# Patient Record
Sex: FEMALE | Race: WHITE | NOT HISPANIC OR LATINO | Employment: FULL TIME | URBAN - NONMETROPOLITAN AREA
[De-identification: names, ages, dates, MRNs, and addresses within clinical notes are randomized per-mention and may not be internally consistent; named-entity substitution may affect disease eponyms.]

---

## 2024-08-08 ENCOUNTER — OFFICE VISIT (OUTPATIENT)
Dept: FAMILY MEDICINE CLINIC | Facility: CLINIC | Age: 24
End: 2024-08-08
Payer: COMMERCIAL

## 2024-08-08 VITALS
DIASTOLIC BLOOD PRESSURE: 76 MMHG | HEIGHT: 66 IN | BODY MASS INDEX: 40.45 KG/M2 | OXYGEN SATURATION: 98 % | WEIGHT: 251.7 LBS | HEART RATE: 69 BPM | SYSTOLIC BLOOD PRESSURE: 118 MMHG

## 2024-08-08 DIAGNOSIS — E66.01 MORBID (SEVERE) OBESITY DUE TO EXCESS CALORIES: ICD-10-CM

## 2024-08-08 DIAGNOSIS — Z13.220 LIPID SCREENING: ICD-10-CM

## 2024-08-08 DIAGNOSIS — Z13.29 THYROID DISORDER SCREEN: ICD-10-CM

## 2024-08-08 DIAGNOSIS — Z13.1 DIABETES MELLITUS SCREENING: ICD-10-CM

## 2024-08-08 DIAGNOSIS — Z00.00 ANNUAL PHYSICAL EXAM: Primary | ICD-10-CM

## 2024-08-08 PROCEDURE — 99395 PREV VISIT EST AGE 18-39: CPT | Performed by: FAMILY MEDICINE

## 2024-08-08 PROCEDURE — 36415 COLL VENOUS BLD VENIPUNCTURE: CPT | Performed by: FAMILY MEDICINE

## 2024-08-08 RX ORDER — OMEPRAZOLE 40 MG/1
40 CAPSULE, DELAYED RELEASE ORAL DAILY
Qty: 90 CAPSULE | Refills: 3 | Status: SHIPPED | OUTPATIENT
Start: 2024-08-08

## 2024-08-08 RX ORDER — SEMAGLUTIDE 0.25 MG/.5ML
0.25 INJECTION, SOLUTION SUBCUTANEOUS WEEKLY
Qty: 2 ML | Refills: 0 | Status: SHIPPED | OUTPATIENT
Start: 2024-08-08

## 2024-08-08 NOTE — PROGRESS NOTES
Female Physical Note      Date: 2024   Patient Name: Maria A Iniguez  : 2000   MRN: [id number]     Chief Complaint:    Chief Complaint   Patient presents with    [facility]    Annual Exam     Fasting for labs    Would like to discuss weight management        History of Present Illness: Maria A Iniguez is a 23 y.o. female who is here today for their annual health maintenance and physical.  Patient is here for annual physical and labs.  She denies any new complaints other than concerns for weight loss management.  Patient states she has been working out and dieting and not losing weight.  She would like to have her thyroid checked.      Subjective      Review of Systems:   Review of Systems   All other systems reviewed and are negative.      Past Medical History, Social History, Family History and Care Team were all reviewed with patient and updated as appropriate.     Medications:     Current Outpatient Medications:     omeprazole (priLOSEC) 40 MG capsule, Take 1 capsule by mouth Daily., Disp: 90 capsule, Rfl: 3    Sprintec 28 0.25-35 MG-MCG per tablet, Take 1 tablet by mouth Daily., Disp: , Rfl:     Semaglutide-Weight Management (Wegovy) 0.25 MG/0.5ML solution auto-injector, Inject 0.5 mL under the skin into the appropriate area as directed 1 (One) Time Per Week., Disp: 2 mL, Rfl: 0    Allergies:   No Known Allergies    Immunizations:  Health Maintenance Summary            Overdue - TDAP/TD VACCINES (2 - Td or Tdap) Overdue since 2011  Imm Admin: Tdap              Overdue - HEPATITIS C SCREENING (Once) Never done      No completion, postpone, or frequency change history exists for this topic.              Overdue - CHLAMYDIA SCREENING (Yearly) Never done      No completion, postpone, or frequency change history exists for this topic.              INFLUENZA VACCINE (Yearly - August to March) Due since 2022  Imm Admin: Fluzone (or Fluarix & Flulaval for  "VFC) >6mos    01/24/2020  Imm Admin: Influenza, Unspecified    01/24/2020  Imm Admin: Influenza Injectable Mdck Pf Quad    11/02/2016  Imm Admin: Flu Vaccine Quad PF >36MO    11/02/2016  Imm Admin: Fluzone (or Fluarix & Flulaval for VFC) >6mos    Only the first 5 history entries have been loaded, but more history exists.              Postponed - COVID-19 Vaccine (3 - 2023-24 season) Postponed until 10/28/2024      08/08/2024  Postponed until 10/28/2024 by Sayda Salazar CMA (Product Unavailable)    09/12/2021  Imm Admin: COVID-19 (PFIZER) PURPLE CAP    08/18/2021  Imm Admin: COVID-19 (PFIZER) PURPLE CAP              ANNUAL PHYSICAL (Yearly) Next due on 8/23/2024 08/23/2023  Registry Metric: Last Annual Physical              BMI FOLLOWUP (Yearly) Next due on 8/8/2025 08/08/2024  [employer]: WORKFLOW - QUALITY MEASUREMENT - DOCUMENTED WEIGHT FOLLOW-UP PLAN              PAP SMEAR (Every 3 Years) Next due on 3/18/2027      03/18/2024  Patient-Reported (Performed Externally) - [facility] [address]              HPV VACCINES (Series Information) Completed      01/25/2012  Imm Admin: HPV Quadrivalent    09/06/2011  Imm Admin: HPV Quadrivalent    06/24/2011  Imm Admin: HPV, Unspecified              Pneumococcal Vaccine 0-64 (Series Information) Aged Out      No completion, postpone, or frequency change history exists for this topic.                     No orders of the defined types were placed in this encounter.              HIV (Age 15-65 once): No results found for: \"HIV1X2\"  A1c: No results found for: \"HGBA1C\"   Lipid panel:  No results found for: \"LIPIDEXCLUSI\"    The ASCVD Risk score (Naseem DK, et al., 2019) failed to calculate for the following reasons:    The 2019 ASCVD risk score is only valid for ages 40 to 79      Tobacco Use: Low Risk  (8/8/2024)    Patient History     Smoking Tobacco Use: Never     Smokeless Tobacco Use: Never     Passive Exposure: Not on file       Social History " "    Substance and Sexual Activity   Alcohol Use Yes    Comment: very rare        Social History     Substance and Sexual Activity   Drug Use Never        Diet/Physical activity: Routine physical activity and specific diet plan for weight loss.    Depression: PHQ-2 Depression Screening  PHQ-9 Total Score: 0       Objective     Physical Exam:  Vital Signs:   Vitals:    08/08/24 0839   BP: 118/76   BP Location: Left arm   Patient Position: Sitting   Cuff Size: Large Adult   Pulse: 69   SpO2: 98%   Weight: 114 kg (251 lb 11.2 oz)   Height: 167.6 cm (66\")     Facility age limit for growth %cherrie is 20 years.  Body mass index is 40.63 kg/m².     Physical Exam  Vitals and nursing note reviewed.   Constitutional:       General: She is not in acute distress.     Appearance: Normal appearance. She is not ill-appearing or toxic-appearing.   HENT:      Head: Normocephalic and atraumatic.      Mouth/Throat:      Pharynx: Oropharynx is clear.   Eyes:      Extraocular Movements: Extraocular movements intact.      Pupils: Pupils are equal, round, and reactive to light.   Neck:      Thyroid: No thyromegaly.   Cardiovascular:      Rate and Rhythm: Normal rate and regular rhythm.   Pulmonary:      Effort: Pulmonary effort is normal.      Breath sounds: Normal breath sounds.   Musculoskeletal:      Cervical back: Neck supple.   Lymphadenopathy:      Cervical: No cervical adenopathy.   Neurological:      General: No focal deficit present.      Mental Status: She is alert.   Psychiatric:         Mood and Affect: Mood normal.         Thought Content: Thought content normal.         Judgment: Judgment normal.         POCT Results (if applicable);   Results for orders placed or performed in visit on 08/23/23   Comprehensive Metabolic Panel    Specimen: Arm, Right; Blood   Result Value Ref Range    Glucose 83 70 - 99 mg/dL    BUN 10 6 - 20 mg/dL    Creatinine 0.59 0.57 - 1.00 mg/dL    EGFR Result 130 >59 mL/min/1.73    BUN/Creatinine Ratio 17 " 9 - 23    Sodium 138 134 - 144 mmol/L    Potassium 4.5 3.5 - 5.2 mmol/L    Chloride 103 96 - 106 mmol/L    Total CO2 20 20 - 29 mmol/L    Calcium 9.1 8.7 - 10.2 mg/dL    Total Protein 6.9 6.0 - 8.5 g/dL    Albumin 4.2 4.0 - 5.0 g/dL    Globulin 2.7 1.5 - 4.5 g/dL    A/G Ratio 1.6 1.2 - 2.2    Total Bilirubin <0.2 0.0 - 1.2 mg/dL    Alkaline Phosphatase 67 44 - 121 IU/L    AST (SGOT) 11 0 - 40 IU/L    ALT (SGPT) 8 0 - 32 IU/L   Lipid Panel    Specimen: Arm, Right; Blood   Result Value Ref Range    Total Cholesterol 192 100 - 199 mg/dL    Triglycerides 91 0 - 149 mg/dL    HDL Cholesterol 64 >39 mg/dL    VLDL Cholesterol Brian 16 5 - 40 mg/dL    LDL Chol Calc ([facility]) 112 (H) 0 - 99 mg/dL   TSH    Specimen: Arm, Right; Blood   Result Value Ref Range    TSH 1.150 0.450 - 4.500 uIU/mL   CBC Auto Differential    Specimen: Arm, Right; Blood   Result Value Ref Range    WBC 6.5 3.4 - 10.8 x10E3/uL    RBC 3.88 3.77 - 5.28 x10E6/uL    Hemoglobin 11.9 11.1 - 15.9 g/dL    Hematocrit 36.3 34.0 - 46.6 %    MCV 94 79 - 97 fL    MCH 30.7 26.6 - 33.0 pg    MCHC 32.8 31.5 - 35.7 g/dL    RDW 12.6 11.7 - 15.4 %    Platelets 352 150 - 450 x10E3/uL    Neutrophil Rel % 59 Not Estab. %    Lymphocyte Rel % 31 Not Estab. %    Monocyte Rel % 7 Not Estab. %    Eosinophil Rel % 3 Not Estab. %    Basophil Rel % 0 Not Estab. %    Neutrophils Absolute 3.8 1.4 - 7.0 x10E3/uL    Lymphocytes Absolute 2.0 0.7 - 3.1 x10E3/uL    Monocytes Absolute 0.5 0.1 - 0.9 x10E3/uL    Eosinophils Absolute 0.2 0.0 - 0.4 x10E3/uL    Basophils Absolute 0.0 0.0 - 0.2 x10E3/uL    Immature Granulocyte Rel % 0 Not Estab. %    Immature Grans Absolute 0.0 0.0 - 0.1 x10E3/uL        Procedures    Assessment / Plan      Assessment/Plan:   Assessment & Plan  Annual physical exam  We will order routine labs.  Morbid (severe) obesity due to excess calories  Discussed options.  Patient is interested in a trial of Wegovy.  Will initiate at 0.25 mg weekly for a month.  If tolerating well  we will titrate.  Patient is to notify me if she would like to increase the dose in 1 month.  Will check TSH.  Thyroid disorder screen    Diabetes mellitus screening    Lipid screening      Orders Placed This Encounter   Procedures    Hemoglobin A1c    TSH    CBC Auto Differential    Comprehensive Metabolic Panel    Lipid Panel     New Medications Ordered This Visit   Medications    omeprazole (priLOSEC) 40 MG capsule     Sig: Take 1 capsule by mouth Daily.     Dispense:  90 capsule     Refill:  3    Semaglutide-Weight Management (Wegovy) 0.25 MG/0.5ML solution auto-injector     Sig: Inject 0.5 mL under the skin into the appropriate area as directed 1 (One) Time Per Week.     Dispense:  2 mL     Refill:  0          Healthcare Maintenance:  Counseling provided based on age appropriate USPSTF guidelines.       Maria A Iniguez voices understanding and acceptance of this advice and will call back with any further questions or concerns. AVS with preventive healthcare tips printed for patient.     Vaccine Counseling:      Follow Up:   Return in about 3 months (around 11/8/2024).      Shabbir Martini MD  [facility]

## 2024-08-09 ENCOUNTER — TELEPHONE (OUTPATIENT)
Dept: FAMILY MEDICINE CLINIC | Facility: CLINIC | Age: 24
End: 2024-08-09
Payer: COMMERCIAL

## 2024-08-09 LAB
ALBUMIN SERPL-MCNC: 4.1 G/DL (ref 4–5)
ALP SERPL-CCNC: 75 IU/L (ref 44–121)
ALT SERPL-CCNC: 10 IU/L (ref 0–32)
AST SERPL-CCNC: 15 IU/L (ref 0–40)
BASOPHILS # BLD AUTO: 0 X10E3/UL (ref 0–0.2)
BASOPHILS NFR BLD AUTO: 0 %
BILIRUB SERPL-MCNC: 0.2 MG/DL (ref 0–1.2)
BUN SERPL-MCNC: 7 MG/DL (ref 6–20)
BUN/CREAT SERPL: 10 (ref 9–23)
CALCIUM SERPL-MCNC: 9.4 MG/DL (ref 8.7–10.2)
CHLORIDE SERPL-SCNC: 106 MMOL/L (ref 96–106)
CHOLEST SERPL-MCNC: 159 MG/DL (ref 100–199)
CO2 SERPL-SCNC: 19 MMOL/L (ref 20–29)
CREAT SERPL-MCNC: 0.7 MG/DL (ref 0.57–1)
EGFRCR SERPLBLD CKD-EPI 2021: 124 ML/MIN/1.73
EOSINOPHIL # BLD AUTO: 0.1 X10E3/UL (ref 0–0.4)
EOSINOPHIL NFR BLD AUTO: 2 %
ERYTHROCYTE [DISTWIDTH] IN BLOOD BY AUTOMATED COUNT: 12.7 % (ref 11.7–15.4)
GLOBULIN SER CALC-MCNC: 3 G/DL (ref 1.5–4.5)
GLUCOSE SERPL-MCNC: 82 MG/DL (ref 70–99)
HBA1C MFR BLD: 5.2 % (ref 4.8–5.6)
HCT VFR BLD AUTO: 37.1 % (ref 34–46.6)
HDLC SERPL-MCNC: 52 MG/DL
HGB BLD-MCNC: 11.7 G/DL (ref 11.1–15.9)
IMM GRANULOCYTES # BLD AUTO: 0 X10E3/UL (ref 0–0.1)
IMM GRANULOCYTES NFR BLD AUTO: 0 %
LDLC SERPL CALC-MCNC: 88 MG/DL (ref 0–99)
LYMPHOCYTES # BLD AUTO: 1.9 X10E3/UL (ref 0.7–3.1)
LYMPHOCYTES NFR BLD AUTO: 29 %
MCH RBC QN AUTO: 30.2 PG (ref 26.6–33)
MCHC RBC AUTO-ENTMCNC: 31.5 G/DL (ref 31.5–35.7)
MCV RBC AUTO: 96 FL (ref 79–97)
MONOCYTES # BLD AUTO: 0.4 X10E3/UL (ref 0.1–0.9)
MONOCYTES NFR BLD AUTO: 7 %
NEUTROPHILS # BLD AUTO: 4.1 X10E3/UL (ref 1.4–7)
NEUTROPHILS NFR BLD AUTO: 62 %
PLATELET # BLD AUTO: 356 X10E3/UL (ref 150–450)
POTASSIUM SERPL-SCNC: 4.8 MMOL/L (ref 3.5–5.2)
PROT SERPL-MCNC: 7.1 G/DL (ref 6–8.5)
RBC # BLD AUTO: 3.88 X10E6/UL (ref 3.77–5.28)
SODIUM SERPL-SCNC: 139 MMOL/L (ref 134–144)
TRIGL SERPL-MCNC: 102 MG/DL (ref 0–149)
TSH SERPL DL<=0.005 MIU/L-ACNC: 1.16 UIU/ML (ref 0.45–4.5)
VLDLC SERPL CALC-MCNC: 19 MG/DL (ref 5–40)
WBC # BLD AUTO: 6.5 X10E3/UL (ref 3.4–10.8)

## 2024-08-17 DIAGNOSIS — E66.01 MORBID (SEVERE) OBESITY DUE TO EXCESS CALORIES: ICD-10-CM

## 2024-08-19 NOTE — TELEPHONE ENCOUNTER
Call to Pharmacy, confirmed rx was picked up on 8/11/24.  Call to patient, no answer, VM not set up. (Re: wegovy refill too soon)

## 2024-09-10 DIAGNOSIS — E66.01 MORBID (SEVERE) OBESITY DUE TO EXCESS CALORIES: ICD-10-CM

## 2024-09-10 RX ORDER — SEMAGLUTIDE 0.25 MG/.5ML
0.25 INJECTION, SOLUTION SUBCUTANEOUS WEEKLY
Qty: 2 ML | Refills: 0 | Status: CANCELLED | OUTPATIENT
Start: 2024-09-10

## 2024-09-11 RX ORDER — SEMAGLUTIDE 0.25 MG/.5ML
INJECTION, SOLUTION SUBCUTANEOUS
Qty: 4 ML | Refills: 0 | OUTPATIENT
Start: 2024-09-11

## 2024-09-11 RX ORDER — SEMAGLUTIDE 0.5 MG/.5ML
0.5 INJECTION, SOLUTION SUBCUTANEOUS WEEKLY
Qty: 2 ML | Refills: 0 | Status: SHIPPED | OUTPATIENT
Start: 2024-09-11

## 2024-10-07 DIAGNOSIS — E66.01 MORBID (SEVERE) OBESITY DUE TO EXCESS CALORIES: ICD-10-CM

## 2024-10-07 DIAGNOSIS — E66.01 MORBID (SEVERE) OBESITY DUE TO EXCESS CALORIES: Primary | ICD-10-CM

## 2024-10-07 RX ORDER — SEMAGLUTIDE 1 MG/.5ML
1 INJECTION, SOLUTION SUBCUTANEOUS WEEKLY
Qty: 2 ML | Refills: 0 | Status: SHIPPED | OUTPATIENT
Start: 2024-10-07

## 2024-10-07 RX ORDER — SEMAGLUTIDE 0.5 MG/.5ML
INJECTION, SOLUTION SUBCUTANEOUS
Qty: 4 ML | Refills: 0 | OUTPATIENT
Start: 2024-10-07

## 2024-11-08 ENCOUNTER — OFFICE VISIT (OUTPATIENT)
Dept: FAMILY MEDICINE CLINIC | Facility: CLINIC | Age: 24
End: 2024-11-08
Payer: COMMERCIAL

## 2024-11-08 VITALS
HEART RATE: 96 BPM | HEIGHT: 66 IN | OXYGEN SATURATION: 99 % | BODY MASS INDEX: 37.45 KG/M2 | WEIGHT: 233 LBS | SYSTOLIC BLOOD PRESSURE: 114 MMHG | DIASTOLIC BLOOD PRESSURE: 70 MMHG

## 2024-11-08 DIAGNOSIS — N94.9 ADNEXAL CYST: ICD-10-CM

## 2024-11-08 DIAGNOSIS — R11.2 NAUSEA VOMITING AND DIARRHEA: Primary | ICD-10-CM

## 2024-11-08 DIAGNOSIS — R91.1 PULMONARY NODULE: ICD-10-CM

## 2024-11-08 DIAGNOSIS — E66.01 MORBID (SEVERE) OBESITY DUE TO EXCESS CALORIES: ICD-10-CM

## 2024-11-08 DIAGNOSIS — R19.7 NAUSEA VOMITING AND DIARRHEA: Primary | ICD-10-CM

## 2024-11-08 DIAGNOSIS — Z23 NEED FOR INFLUENZA VACCINATION: ICD-10-CM

## 2024-11-08 DIAGNOSIS — R14.0 ABDOMINAL BLOATING: ICD-10-CM

## 2024-11-08 RX ORDER — ONDANSETRON 4 MG/1
4 TABLET, ORALLY DISINTEGRATING ORAL EVERY 6 HOURS
COMMUNITY
Start: 2024-11-04 | End: 2024-11-08

## 2024-11-08 RX ORDER — CEFDINIR 300 MG/1
300 CAPSULE ORAL EVERY 12 HOURS
COMMUNITY
Start: 2024-11-04

## 2024-11-08 RX ORDER — ONDANSETRON 4 MG/1
4 TABLET, ORALLY DISINTEGRATING ORAL EVERY 8 HOURS PRN
Qty: 15 TABLET | Refills: 1 | Status: SHIPPED | OUTPATIENT
Start: 2024-11-08

## 2024-11-08 RX ORDER — FLUCONAZOLE 150 MG/1
150 TABLET ORAL ONCE
Qty: 1 TABLET | Refills: 1 | Status: SHIPPED | OUTPATIENT
Start: 2024-11-08 | End: 2024-11-08

## 2024-11-08 NOTE — PROGRESS NOTES
Follow Up Office Visit      Patient Name: Maria A Iniguez  : 2000   MRN: [id number]     Chief Complaint:    Chief Complaint   Patient presents with    Nausea    Abdominal Pain    Bloated       History of Present Illness: Maria A Iniguez is a 23 y.o. female who is here today for follow up with ER visit.  Patient was in the ER with nausea, abdominal bloating/abdominal pain, diarrhea.  Patient CT scan with nonspecific findings of the abdomen.  There were lung nodules noted incidentally undetermined etiology.  Patient states her next dose of Wegovy should be tomorrow.  Patient is requesting a refill on Zofran and prescription for Diflucan.    Subjective      Review of Systems:   Review of Systems   Constitutional:  Negative for fever.   Gastrointestinal:  Positive for abdominal distention, abdominal pain, diarrhea, nausea, vomiting, GERD and indigestion.   All other systems reviewed and are negative.  Answers submitted by the patient for this visit:  Other (Submitted on 2024)  Please describe your symptoms.: Following up on my wegovy appointment. , Have had some things going on: bloating, vomiting (and including throwing up bile), nausea, gas (including sulfur burps), diarrhea, acid reflux,  Have you had these symptoms before?: Yes  How long have you been having these symptoms?: Greater than 2 weeks  Please list any medications you are currently taking for this condition.: Currently have an UTI taking: cefdinir 300mg (can I be prescribed diflucan to prevent w yeast infection?), Zofran (last one today), Sprintec, Omeprazole 40mg  Please describe any probable cause for these symptoms. : Not sure if its side effects or if i caught something. , I had a scan done and the tech said there was something in my lower adonmen that may be it.  Primary Reason for Visit (Submitted on 2024)  What is the primary reason for your visit?: Problem Not Listed      The following portions of the patient's history were  "reviewed and updated as appropriate: allergies, current medications, past family history, past medical history, past social history, past surgical history and problem list.    Medications:     Current Outpatient Medications:     cefdinir (OMNICEF) 300 MG capsule, Take 1 capsule by mouth Every 12 (Twelve) Hours., Disp: , Rfl:     omeprazole (priLOSEC) 40 MG capsule, Take 1 capsule by mouth Daily., Disp: 90 capsule, Rfl: 3    Sprintec 28 0.25-35 MG-MCG per tablet, Take 1 tablet by mouth Daily., Disp: , Rfl:     fluconazole (Diflucan) 150 MG tablet, Take 1 tablet by mouth 1 (One) Time for 1 dose., Disp: 1 tablet, Rfl: 1    ondansetron ODT (ZOFRAN-ODT) 4 MG disintegrating tablet, Place 1 tablet on the tongue Every 8 (Eight) Hours As Needed for Nausea or Vomiting., Disp: 15 tablet, Rfl: 1    Allergies:   Allergies   Allergen Reactions    Lactose GI Intolerance    Red Dye GI Intolerance     As a child        Objective     Physical Exam:  Vital Signs:   Vitals:    11/08/24 0801   BP: 114/70   BP Location: Right arm   Patient Position: Sitting   Cuff Size: Large Adult   Pulse: 96   SpO2: 99%   Weight: 106 kg (233 lb)   Height: 167.6 cm (66\")   PainSc:   1     Body mass index is 37.61 kg/m².   Facility age limit for growth %cherrie is 20 years.    Physical Exam  Vitals and nursing note reviewed.   Constitutional:       General: She is not in acute distress.     Appearance: Normal appearance. She is not ill-appearing.   Cardiovascular:      Rate and Rhythm: Normal rate.   Pulmonary:      Effort: Pulmonary effort is normal.   Abdominal:      Tenderness: There is abdominal tenderness. There is no guarding or rebound.       Neurological:      Mental Status: She is alert.         Procedures    PHQ-9 Total Score:      Assessment / Plan      Assessment/Plan:   Assessment & Plan  Nausea vomiting and diarrhea         Need for influenza vaccination    Orders:    Fluzone >6mos ([id number])    Abdominal bloating         Pulmonary " nodule    Orders:    Ambulatory Referral to Pulmonology    Morbid (severe) obesity due to excess calories       Adnexal cyst            I suspect symptoms are side effects of Wegovy.  I have recommended patient stop the Wegovy.  Will send in Zofran for nausea.  Will see back in 2 weeks to see how she is doing.  We may consider a trial of Zepbound.  Will discuss further at that time.  Incidentally, pulmonary nodules were noted on CT scan reviewed from 11/4/2024.  Will refer to the nodule clinic for further evaluation.  I recommend patient obtain a CD of her CT scan to take with her.  I recommend patient follow-up with her GYN for adnexal cyst.           Follow Up:   Return in about 2 weeks (around 11/22/2024).      ROSCOE Martini MD  [facility]

## 2024-12-03 ENCOUNTER — OFFICE VISIT (OUTPATIENT)
Dept: FAMILY MEDICINE CLINIC | Facility: CLINIC | Age: 24
End: 2024-12-03
Payer: COMMERCIAL

## 2024-12-03 VITALS
HEIGHT: 66 IN | BODY MASS INDEX: 38.09 KG/M2 | RESPIRATION RATE: 14 BRPM | WEIGHT: 237 LBS | SYSTOLIC BLOOD PRESSURE: 112 MMHG | HEART RATE: 70 BPM | DIASTOLIC BLOOD PRESSURE: 68 MMHG

## 2024-12-03 DIAGNOSIS — R11.0 NAUSEA: ICD-10-CM

## 2024-12-03 DIAGNOSIS — E66.01 MORBID (SEVERE) OBESITY DUE TO EXCESS CALORIES: Primary | ICD-10-CM

## 2024-12-03 PROCEDURE — 99213 OFFICE O/P EST LOW 20 MIN: CPT | Performed by: FAMILY MEDICINE

## 2024-12-03 RX ORDER — ONDANSETRON 4 MG/1
4 TABLET, ORALLY DISINTEGRATING ORAL EVERY 8 HOURS PRN
Qty: 20 TABLET | Refills: 1 | Status: SHIPPED | OUTPATIENT
Start: 2024-12-03

## 2024-12-03 NOTE — PROGRESS NOTES
Follow Up Office Visit      Patient Name: Maria A Iniguez  : 2000   MRN: [id number]     Chief Complaint:    Chief Complaint   Patient presents with    Obesity       History of Present Illness: Maria A Iniguez is a 23 y.o. female who is here today for follow up with nausea and vomiting.  Symptoms have resolved off semaglutide.  She is interested in trying tirzepatide.  She denies any other complaints today.    Subjective      Review of Systems:   Review of Systems   Constitutional:  Negative for chills, diaphoresis, fatigue and fever.   HENT:  Negative for congestion, sore throat and swollen glands.    Respiratory:  Negative for cough.    Cardiovascular:  Negative for chest pain.   Gastrointestinal:  Negative for abdominal pain, nausea and vomiting.   Genitourinary:  Negative for dysuria.   Musculoskeletal:  Negative for myalgias and neck pain.   Skin:  Negative for rash.   Neurological:  Negative for weakness and numbness.       The following portions of the patient's history were reviewed and updated as appropriate: allergies, current medications, past family history, past medical history, past social history, past surgical history and problem list.    Medications:     Current Outpatient Medications:     omeprazole (priLOSEC) 40 MG capsule, Take 1 capsule by mouth Daily., Disp: 90 capsule, Rfl: 3    Sprintec 28 0.25-35 MG-MCG per tablet, Take 1 tablet by mouth Daily., Disp: , Rfl:     ondansetron ODT (ZOFRAN-ODT) 4 MG disintegrating tablet, Place 1 tablet on the tongue Every 8 (Eight) Hours As Needed for Nausea or Vomiting., Disp: 20 tablet, Rfl: 1    Tirzepatide-Weight Management (ZEPBOUND) 2.5 MG/0.5ML solution auto-injector, Inject 0.5 mL under the skin into the appropriate area as directed 1 (One) Time Per Week., Disp: 2 mL, Rfl: 0    Allergies:   Allergies   Allergen Reactions    Lactose GI Intolerance    Red Dye GI Intolerance     As a child        Objective     Physical Exam:  Vital Signs:  "  Vitals:    12/03/24 1132   BP: 112/68   BP Location: Left arm   Patient Position: Sitting   Cuff Size: Adult   Pulse: 70   Resp: 14   Weight: 108 kg (237 lb)   Height: 167.6 cm (66\")   PainSc: 0-No pain     Body mass index is 38.25 kg/m².   Facility age limit for growth %cherrie is 20 years.    Physical Exam  Vitals and nursing note reviewed.   Constitutional:       Appearance: She is obese.   Cardiovascular:      Rate and Rhythm: Normal rate.   Pulmonary:      Effort: Pulmonary effort is normal.   Neurological:      Mental Status: She is alert. Mental status is at baseline.   Psychiatric:         Mood and Affect: Mood normal.         Thought Content: Thought content normal.         Judgment: Judgment normal.         Procedures    PHQ-9 Total Score:      Assessment / Plan      Assessment/Plan:   Assessment & Plan  Morbid (severe) obesity due to excess calories  Nausea, vomiting resolved after stopping semaglutide.  She is fairly confident the symptoms were side effects of semaglutide.  She is interested in trying tirzepatide.  Will start at 2.5 mg and titrate monthly as discussed.  I recommend patient notify me if she develops nausea and vomiting.  Will refill Zofran to use as needed.    Orders:    Tirzepatide-Weight Management (ZEPBOUND) 2.5 MG/0.5ML solution auto-injector; Inject 0.5 mL under the skin into the appropriate area as directed 1 (One) Time Per Week.    Nausea    Orders:    ondansetron ODT (ZOFRAN-ODT) 4 MG disintegrating tablet; Place 1 tablet on the tongue Every 8 (Eight) Hours As Needed for Nausea or Vomiting.                  Follow Up:   No follow-ups on file.      ROSCOE Martini MD  [facility] [place]  Answers submitted by the patient for this visit:  Primary Reason for Visit (Submitted on 12/3/2024)  What is the primary reason for your visit?: Problem Not Listed  Problem not listed (Submitted on 12/3/2024)  Chief Complaint: Other medical problem  Reason for appointment: Weught loss " medience  anorexia: No  joint pain: No  change in stool: No  headaches: No  joint swelling: No  vertigo: No  visual change: No  Medications tried: Tried wegovy  Additional information: Here today to discuss new weight management

## 2024-12-03 NOTE — ASSESSMENT & PLAN NOTE
Nausea, vomiting resolved after stopping semaglutide.  She is fairly confident the symptoms were side effects of semaglutide.  She is interested in trying tirzepatide.  Will start at 2.5 mg and titrate monthly as discussed.  I recommend patient notify me if she develops nausea and vomiting.  Will refill Zofran to use as needed.    Orders:    Tirzepatide-Weight Management (ZEPBOUND) 2.5 MG/0.5ML solution auto-injector; Inject 0.5 mL under the skin into the appropriate area as directed 1 (One) Time Per Week.

## 2025-01-02 DIAGNOSIS — E66.01 MORBID (SEVERE) OBESITY DUE TO EXCESS CALORIES: ICD-10-CM

## 2025-01-02 RX ORDER — TIRZEPATIDE 2.5 MG/.5ML
INJECTION, SOLUTION SUBCUTANEOUS
Qty: 4 ML | Refills: 0 | Status: SHIPPED | OUTPATIENT
Start: 2025-01-02

## 2025-01-09 ENCOUNTER — PATIENT OUTREACH (OUTPATIENT)
Dept: ONCOLOGY | Facility: CLINIC | Age: 25
End: 2025-01-09
Payer: COMMERCIAL

## 2025-01-09 ENCOUNTER — OFFICE VISIT (OUTPATIENT)
Age: 25
End: 2025-01-09
Payer: COMMERCIAL

## 2025-01-09 ENCOUNTER — PATIENT ROUNDING (BHMG ONLY) (OUTPATIENT)
Dept: PULMONOLOGY | Facility: CLINIC | Age: 25
End: 2025-01-09
Payer: COMMERCIAL

## 2025-01-09 VITALS
HEIGHT: 66 IN | DIASTOLIC BLOOD PRESSURE: 70 MMHG | BODY MASS INDEX: 38.25 KG/M2 | HEART RATE: 95 BPM | WEIGHT: 238 LBS | OXYGEN SATURATION: 99 % | SYSTOLIC BLOOD PRESSURE: 122 MMHG

## 2025-01-09 DIAGNOSIS — R91.1 LUNG NODULE: Primary | ICD-10-CM

## 2025-01-09 PROCEDURE — 99203 OFFICE O/P NEW LOW 30 MIN: CPT | Performed by: INTERNAL MEDICINE

## 2025-01-09 NOTE — PROGRESS NOTES
New Patient Pulmonary Office Visit      Patient Name: Maria A Iniguez    Referring Physician: Juan Martini MD    Chief Complaint:    Chief Complaint   Patient presents with    Lung Nodule       History of Present Illness: Maria A Iniguez is a 24 y.o. female who is here today to establish care with Pulmonary.  Patient has a past medical history significant for GERD and obesity.  He was referred to pulmonary for evaluation of pulmonary nodule.  Patient has CT abdomen pelvis showed a pulmonary nodule.  Patient was having abdominal pain and when she was taking Wegovy this led to a CT scan of the abdomen pelvis showing that she had a small left lower lobe pulmonary nodule.  Denies any chest pain, nausea, fever, or chills.    Review of Systems:   Review of Systems   Constitutional:  Negative for chills, fatigue and fever.   HENT:  Negative for congestion and voice change.    Eyes:  Negative for blurred vision.   Respiratory:  Negative for cough, shortness of breath and wheezing.    Cardiovascular:  Negative for chest pain.   Skin:  Negative for dry skin.   Hematological:  Negative for adenopathy.   Psychiatric/Behavioral:  Negative for agitation and depressed mood.        Past Medical History:   Past Medical History:   Diagnosis Date    Allergic     Anemia     Asthma     GERD (gastroesophageal reflux disease)     Pneumonia 2003    Urinary tract infection     Currently on medication for a uti       Past Surgical History:   Past Surgical History:   Procedure Laterality Date    CHOLECYSTECTOMY         Family History:   Family History   Problem Relation Age of Onset    Heart disease Brother     Asthma Mother     Alcohol abuse Maternal Grandfather         Never knew him.    Depression Maternal Grandmother     Hyperlipidemia Paternal Grandfather     Arthritis Paternal Grandmother     Cancer Paternal Grandmother         Triple x breast cancer 2018 and now 2024 lymphatic cancer    Diabetes Maternal Uncle     Early death  "Brother     Heart disease Brother        Social History:   Social History     Socioeconomic History    Marital status:    Tobacco Use    Smoking status: Never    Smokeless tobacco: Never   Vaping Use    Vaping status: Never Used   Substance and Sexual Activity    Alcohol use: Yes     Comment: Seldom    Drug use: Never    Sexual activity: Yes     Partners: Male     Birth control/protection: Birth control pill       Medications:     Current Outpatient Medications:     omeprazole (priLOSEC) 40 MG capsule, Take 1 capsule by mouth Daily., Disp: 90 capsule, Rfl: 3    ondansetron ODT (ZOFRAN-ODT) 4 MG disintegrating tablet, Place 1 tablet on the tongue Every 8 (Eight) Hours As Needed for Nausea or Vomiting., Disp: 20 tablet, Rfl: 1    Sprintec 28 0.25-35 MG-MCG per tablet, Take 1 tablet by mouth Daily., Disp: , Rfl:     Zepbound 2.5 MG/0.5ML solution auto-injector, INJECT 2.5 MG SUBCUTANEOUSLY INTO THE APPROPRIATE AREA AS DIRECTED ONCE PER WEEK, Disp: 4 mL, Rfl: 0    Allergies:   Allergies   Allergen Reactions    Lactose GI Intolerance    Red Dye GI Intolerance     As a child        Physical Exam:  Vital Signs:   Vitals:    01/09/25 0842   BP: 122/70   Pulse: 95   SpO2: 99%   Weight: 108 kg (238 lb)   Height: 167.6 cm (66\")       Physical Exam  Vitals and nursing note reviewed.   Constitutional:       General: She is not in acute distress.     Appearance: She is well-developed and normal weight. She is not ill-appearing or toxic-appearing.   HENT:      Head: Normocephalic and atraumatic.   Cardiovascular:      Rate and Rhythm: Normal rate and regular rhythm.      Pulses: Normal pulses.      Heart sounds: Normal heart sounds. No murmur heard.     No friction rub. No gallop.   Pulmonary:      Effort: Pulmonary effort is normal. No respiratory distress.      Breath sounds: Normal breath sounds. No wheezing, rhonchi or rales.   Musculoskeletal:      Right lower leg: No edema.      Left lower leg: No edema.   Skin:     " General: Skin is warm and dry.   Neurological:      Mental Status: She is alert and oriented to person, place, and time.         Immunization History   Administered Date(s) Administered    COVID-19 (PFIZER) Purple Cap Monovalent 09/12/2021    DTaP 02/24/2000, 04/25/2000, 06/23/2000, 03/28/2001, 08/19/2004    Flu Vaccine Quad PF >36MO 11/02/2016    Fluzone  >6mos 11/08/2024    Fluzone (or Fluarix & Flulaval for VFC) >6mos 11/02/2016, 06/14/2019, 11/21/2022    HPV Quadrivalent 09/06/2011, 01/25/2012    HPV, Unspecified 06/24/2011    Hepatitis B Adult/Adolescent IM 02/24/2000, 06/23/2000    HiB 02/24/2000, 04/25/2000, 06/23/2000    IPV 08/19/2004    Influenza Injectable Mdck Pf Quad 01/24/2020    Influenza, Unspecified 01/24/2020    MMR 03/28/2001, 08/19/2004    Tdap 06/24/2011    Varicella 12/28/2000       Results Review:   - I personally reviewed the pts imaging from CT abdomen pelvis lung windows showed 5 mm left lower lobe noncalcified nodule.  - I personally reviewed the pts chart with regards to evaluation by the patient's PCP    Assessment / Plan:   Diagnoses and all orders for this visit:    1. 5 mm left lower lobe noncalcified nodule (11/2024) (Primary)  -Patient is low risk for cancer.  Given the size of the nodule she does not need any further follow-up.  I did offer her the option of doing a scan in 1 year if she wanted 1 but she was agreeable to not doing anything else.  This is appropriate per Fleischner guidelines.      Follow Up:   Return if symptoms worsen or fail to improve.     KHADIJAH Olivas,   Pulmonary and Critical Care Medicine  Note Electronically Signed    Part of this note may be an electronic transcription/translation of spoken language to printed text using the Dragon Dictation System.

## 2025-01-09 NOTE — PROGRESS NOTES
January 9, 2025    Hello, may I speak with Maria A Iniguez?    My name is Alexandra Urias      I am  with [facility]  [address] [id number] [phone].    Before we get started may I verify your date of birth? 2000    I am calling to officially welcome you to our practice and ask about your recent visit. Is this a good time to talk? yes    Tell me about your visit with us. What things went well?  This patient stated her appointment with Dr. Olivas went well! She said he explained the findings well and eased her concerns. The check in/check out process was smooth. She had no complaints with her overall visit and felt comfortable reaching out to our facility and staff.        We're always looking for ways to make our patients' experiences even better. Do you have recommendations on ways we may improve?  yes This pt stated the NPP she had received in the mail was misleading. When she had scheduled the appointment she was given the [place] location address. However, when she received her NPP in the mail it heavily advertised the [facility] location. She made it to her appointment but had wished for more clarification before her appointment today.     Overall were you satisfied with your first visit to our practice? yes       I appreciate you taking the time to speak with me today. Is there anything else I can do for you? no      Thank you, and have a great day.

## 2025-01-10 ENCOUNTER — TELEPHONE (OUTPATIENT)
Dept: FAMILY MEDICINE CLINIC | Facility: CLINIC | Age: 25
End: 2025-01-10
Payer: COMMERCIAL

## 2025-01-10 NOTE — TELEPHONE ENCOUNTER
Got an approval on 12/4 that stated her zepbound was approved through August of 2025, called patient and she was told by the pharmacy there was an issue with the quantity. Left a vm for the pharmacy to call back and discuss.

## 2025-01-10 NOTE — TELEPHONE ENCOUNTER
Caller: Maria A Iniguez    Relationship: Self    Best call back number:[phone]      Which medication are you concerned about: Zepbound 2.5 MG/0.5ML solution auto-injector     Who prescribed you this medication: DR FONTENOT    When did you start taking this medication: 1 MONTH    What are your concerns: PT IS WAITING ON A PA FOR THE ZEPBOUND BUT HER NEXT SHOT IS SCHEDULED FOR TOMORROW 01-. PT WANTS TO KNOW IF THERE IS ANYTHING SHE CAN DO OR TAKE AS AN ALTERNATIVE WHILE WAITING ON THE PA. PLEASE ADVISE.

## 2025-01-14 ENCOUNTER — TELEPHONE (OUTPATIENT)
Dept: FAMILY MEDICINE CLINIC | Facility: CLINIC | Age: 25
End: 2025-01-14
Payer: COMMERCIAL

## 2025-01-14 NOTE — TELEPHONE ENCOUNTER
Caller: [facility] - [facility] [place]    Relationship to patient:       Best call back number: [phone]     Provider: DR FONTENOT    Medication PA needed: ZEPBOUND    Reason for call/Prior Auth: INSURANCE

## 2025-01-16 NOTE — TELEPHONE ENCOUNTER
PA aprroved on 12/04/24, resubmitted today and not required. Spoke with [facility] pharmacy who states insurance will not pay towards refill on medication until 01/21/25. Patient has been notified.

## 2025-01-17 DIAGNOSIS — E66.01 MORBID (SEVERE) OBESITY DUE TO EXCESS CALORIES: Primary | ICD-10-CM

## 2025-01-17 NOTE — TELEPHONE ENCOUNTER
Patient called and said for zepbound to be covered she needs to go up to the 5mg,  has sent it in and patient notified.

## 2025-02-08 ENCOUNTER — TELEPHONE (OUTPATIENT)
Dept: FAMILY MEDICINE CLINIC | Facility: CLINIC | Age: 25
End: 2025-02-08
Payer: COMMERCIAL

## 2025-02-08 NOTE — TELEPHONE ENCOUNTER
Patient would like a refill on her Zepbound but would like to increase to the 7.5mg please advise and refill

## 2025-03-03 ENCOUNTER — OFFICE VISIT (OUTPATIENT)
Dept: FAMILY MEDICINE CLINIC | Facility: CLINIC | Age: 25
End: 2025-03-03
Payer: COMMERCIAL

## 2025-03-03 VITALS
SYSTOLIC BLOOD PRESSURE: 120 MMHG | HEART RATE: 100 BPM | WEIGHT: 227.1 LBS | DIASTOLIC BLOOD PRESSURE: 76 MMHG | BODY MASS INDEX: 36.5 KG/M2 | OXYGEN SATURATION: 96 % | HEIGHT: 66 IN

## 2025-03-03 DIAGNOSIS — E66.01 MORBID (SEVERE) OBESITY DUE TO EXCESS CALORIES: Primary | ICD-10-CM

## 2025-03-03 PROCEDURE — 99213 OFFICE O/P EST LOW 20 MIN: CPT | Performed by: FAMILY MEDICINE

## 2025-03-03 NOTE — PROGRESS NOTES
"     Follow Up Office Visit      Patient Name: Maria A Iniguez  : 2000   MRN: [id number]     Chief Complaint:    Chief Complaint   Patient presents with    Primary Care Follow-Up     3 month f/u       History of Present Illness: Maria A Iniguez is a 24 y.o. female who is here today for follow up with weight loss.  Patient states she is tolerating the tirzepatide much better than the semaglutide.  She denies any complaints.  She would like to go up on the dosing of tirzepatide.    Subjective      Review of Systems:   Review of Systems   All other systems reviewed and are negative.      The following portions of the patient's history were reviewed and updated as appropriate: allergies, current medications, past family history, past medical history, past social history, past surgical history and problem list.    Medications:     Current Outpatient Medications:     omeprazole (priLOSEC) 40 MG capsule, Take 1 capsule by mouth Daily., Disp: 90 capsule, Rfl: 3    ondansetron ODT (ZOFRAN-ODT) 4 MG disintegrating tablet, Place 1 tablet on the tongue Every 8 (Eight) Hours As Needed for Nausea or Vomiting., Disp: 20 tablet, Rfl: 1    Sprintec 28 0.25-35 MG-MCG per tablet, Take 1 tablet by mouth Daily., Disp: , Rfl:     Tirzepatide-Weight Management (ZEPBOUND) 10 MG/0.5ML solution auto-injector, Inject 0.5 mL under the skin into the appropriate area as directed 1 (One) Time Per Week., Disp: 2 mL, Rfl: 1    Allergies:   Allergies   Allergen Reactions    Lactose GI Intolerance    Red Dye GI Intolerance     As a child        Objective     Physical Exam:  Vital Signs:   Vitals:    25 0759   BP: 120/76   BP Location: Right arm   Patient Position: Sitting   Cuff Size: Adult   Pulse: 100   SpO2: 96%   Weight: 103 kg (227 lb 1.6 oz)   Height: 167.6 cm (66\")     Body mass index is 36.65 kg/m².   Facility age limit for growth %cherrie is 20 years.    Physical Exam  Vitals and nursing note reviewed.   Constitutional:       " Appearance: Normal appearance. She is not ill-appearing.   Cardiovascular:      Rate and Rhythm: Normal rate.   Pulmonary:      Effort: Pulmonary effort is normal.   Neurological:      Mental Status: She is alert.         Procedures    PHQ-9 Total Score:      Assessment / Plan      Assessment/Plan:   Assessment & Plan  Morbid (severe) obesity due to excess calories  Patient is tolerating tirzepatide much better than semaglutide.  Will continue.  Will increase dose to 10 mg weekly.  Will follow-up in 3 months for recheck.  Patient has lost 11 pounds since last visit in January.                     Follow Up:   Return in about 3 months (around 6/3/2025).      ROSCOE Martini MD  [facility] [place]  Answers submitted by the patient for this visit:  Weight Management (Submitted on 3/3/2025)  Chief Complaint: Weight Management  Weight: decreased  Weight change (lbs): 30  Weight loss treatment: portion control, increasing exercise, prescription medications  Medication side effects: nausea  Eating habit changes: Eating smaller portions  Energy level: increased  Physical activity tolerance: improved  Treatment barriers: none

## 2025-03-03 NOTE — ASSESSMENT & PLAN NOTE
Patient is tolerating tirzepatide much better than semaglutide.  Will continue.  Will increase dose to 10 mg weekly.  Will follow-up in 3 months for recheck.  Patient has lost 11 pounds since last visit in January.

## 2025-04-30 RX ORDER — TIRZEPATIDE 10 MG/.5ML
INJECTION, SOLUTION SUBCUTANEOUS
Qty: 2 ML | Refills: 3 | Status: SHIPPED | OUTPATIENT
Start: 2025-04-30

## 2025-05-28 ENCOUNTER — TELEPHONE (OUTPATIENT)
Dept: FAMILY MEDICINE CLINIC | Facility: CLINIC | Age: 25
End: 2025-05-28
Payer: COMMERCIAL

## 2025-05-28 NOTE — TELEPHONE ENCOUNTER
Caller: Maria A Iniguez    Relationship: Self    Best call back number:   Telephone Information:   Mobile [phone]        What medication are you requesting: Tirzepatide-Weight Management (Zepbound) 12.5 MG/0.5ML solution auto-injector    Have you had these symptoms before:    [x] Yes  [] No    Have you been treated for these symptoms before:   [x] Yes  [] No    If a prescription is needed, what is your preferred pharmacy and phone number: [address] [phone] [facility] [phone] [facility]     Additional notes: PATIENT WOULD LIKE TO INCREASE THE DOSAGE OF THIS MEDICATION

## 2025-06-10 ENCOUNTER — OFFICE VISIT (OUTPATIENT)
Dept: FAMILY MEDICINE CLINIC | Facility: CLINIC | Age: 25
End: 2025-06-10
Payer: COMMERCIAL

## 2025-06-10 VITALS
HEIGHT: 66 IN | WEIGHT: 201.5 LBS | BODY MASS INDEX: 32.38 KG/M2 | OXYGEN SATURATION: 99 % | SYSTOLIC BLOOD PRESSURE: 124 MMHG | HEART RATE: 80 BPM | DIASTOLIC BLOOD PRESSURE: 74 MMHG

## 2025-06-10 DIAGNOSIS — Z23 NEED FOR TDAP VACCINATION: ICD-10-CM

## 2025-06-10 DIAGNOSIS — E66.01 MORBID (SEVERE) OBESITY DUE TO EXCESS CALORIES: Primary | ICD-10-CM

## 2025-06-10 NOTE — ASSESSMENT & PLAN NOTE
Patient has lost over 70 pounds on Zepbound.  She would like to stay on 12.5 mg.  She denies any complaints at this time.  She states she will likely be starting a family next year.  She has questions regarding GLP-1's in pregnancy.  I recommend patient stop Zepbound prior to attempts to get pregnant.  I recommend she also discuss with her OB/GYN.  She reports she is feeling great and has increased energy and exercise tolerance.

## 2025-06-10 NOTE — LETTER
[facility]  Vaccine Consent Form    Patient Name:  Maria A Iniguez  Patient :  2000     Vaccine(s) Ordered    Tdap Vaccine Greater Than or Equal To 6yo IM        Screening Checklist  The following questions should be completed prior to vaccination. If you answer “yes” to any question, it does not necessarily mean you should not be vaccinated. It just means we may need to clarify or ask more questions. If a question is unclear, please ask your healthcare provider to explain it.    Yes No   Any fever or moderate to severe illness today (mild illness and/or antibiotic treatment are not contraindications)?     Do you have a history of a serious reaction to any previous vaccinations, such as anaphylaxis, encephalopathy within 7 days, Guillain-Thornton syndrome within 6 weeks, seizure?     Have you received any live vaccine(s) (e.g MMR, ADELIA) or any other vaccines in the last month (to ensure duplicate doses aren't given)?     Do you have an anaphylactic allergy to latex (DTaP, DTaP-IPV, Hep A, Hep B, MenB, RV, Td, Tdap), baker’s yeast (Hep B, HPV), polysorbates (RSV, nirsevimab, PCV 20 and 21, Rotavirrus, Tdap, Shingrix), or gelatin (ADELIA, MMR)?     Do you have an anaphylactic allergy to neomycin (Rabies, ADELIA, MMR, IPV, Hep A), polymyxin B (IPV), or streptomycin (IPV)?      Any cancer, leukemia, AIDS, or other immune system disorder? (ADELIA, MMR, RV)     Do you have a parent, brother, or sister with an immune system problem (if immune competence of vaccine recipient clinically verified, can proceed)? (MMR, ADELIA)     Any recent steroid treatments for >2 weeks, chemotherapy, or radiation treatment? (ADELIA, MMR)     Have you received antibody-containing blood transfusions or IVIG in the past 11 months (recommended interval is dependent on product)? (MMR, ADELIA)     Have you taken antiviral drugs (acyclovir, famciclovir, valacyclovir for ADELIA) in the last 24 or 48 hours, respectively?      Are you pregnant or planning to  "become pregnant within 1 month? (ADELIA, MMR, HPV, IPV, MenB, Abrexvy; For Hep B- refer to Engerix-B; For RSV - Abrysvo is indicated for 32-36 weeks of pregnancy from September to January)     For infants, have you ever been told your child has had intussusception or a medical emergency involving obstruction of the intestine (Rotavirus)? If not for an infant, can skip this question.         *Ordering Physicians/APC should be consulted if \"yes\" is checked by the patient or guardian above.  I have received, read, and understand the Vaccine Information Statement (VIS) for each vaccine ordered.  I have considered my or my child's health status as well as the health status of my close contacts.  I have taken the opportunity to discuss my vaccine questions with my or my child's health care provider.   I have requested that the ordered vaccine(s) be given to me or my child.  I understand the benefits and risks of the vaccines.  I understand that I should remain in the clinic for 15 minutes after receiving the vaccine(s).  _________________________________________________________  Signature of Patient or Parent/Legal Guardian ____________________  Date   "

## 2025-06-10 NOTE — PROGRESS NOTES
Follow Up Office Visit      Patient Name: Maria A Iniguez  : 2000   MRN: [id number]     Chief Complaint:    Chief Complaint   Patient presents with    Weight Management     Pt presents for a 3 month follow up on weight management. Pt previously on Ozempic but switched to Zepbound. Pt was last seen 3/3/25 and Zepbound was increased to 10mg injections. Pt now is on 12.5mg and tolerating well.       History of Present Illness: Maria A Iniguez is a 24 y.o. female who is here today for follow up with weight management.  Patient has lost over 70 pounds on Zepbound.  She is currently on 12.5 mg and doing well.  She states she and her  may be considering starting a family next year.  She denies any other concerns today.  She is agreeable to getting her tetanus vaccine today.    Subjective      Review of Systems:   Review of Systems   All other systems reviewed and are negative.      The following portions of the patient's history were reviewed and updated as appropriate: allergies, current medications, past family history, past medical history, past social history, past surgical history and problem list.    Medications:     Current Outpatient Medications:     omeprazole (priLOSEC) 40 MG capsule, Take 1 capsule by mouth Daily., Disp: 90 capsule, Rfl: 3    ondansetron ODT (ZOFRAN-ODT) 4 MG disintegrating tablet, Place 1 tablet on the tongue Every 8 (Eight) Hours As Needed for Nausea or Vomiting., Disp: 20 tablet, Rfl: 1    Sprintec 28 0.25-35 MG-MCG per tablet, Take 1 tablet by mouth Daily., Disp: , Rfl:     Tirzepatide-Weight Management (ZEPBOUND) 12.5 MG/0.5ML solution auto-injector, Inject 0.5 mL under the skin into the appropriate area as directed 1 (One) Time Per Week., Disp: 2 mL, Rfl: 6    Allergies:   Allergies   Allergen Reactions    Lactose GI Intolerance    Red Dye GI Intolerance     As a child        Objective     Physical Exam:  Vital Signs:   Vitals:    06/10/25 1004   BP: 124/74   BP  "Location: Left arm   Patient Position: Sitting   Cuff Size: Large Adult   Pulse: 80   SpO2: 99%   Weight: 91.4 kg (201 lb 8 oz)   Height: 167.6 cm (66\")     Body mass index is 32.52 kg/m².   Facility age limit for growth %cherrie is 20 years.    Physical Exam  Vitals and nursing note reviewed.   Constitutional:       Appearance: Normal appearance.   Cardiovascular:      Rate and Rhythm: Normal rate.   Pulmonary:      Effort: Pulmonary effort is normal.   Neurological:      Mental Status: She is alert. Mental status is at baseline.   Psychiatric:         Mood and Affect: Mood normal.         Thought Content: Thought content normal.         Judgment: Judgment normal.         Procedures    PHQ-9 Total Score:      Assessment / Plan      Assessment/Plan:   Assessment & Plan  Morbid (severe) obesity due to excess calories  Patient has lost over 70 pounds on Zepbound.  She would like to stay on 12.5 mg.  She denies any complaints at this time.  She states she will likely be starting a family next year.  She has questions regarding GLP-1's in pregnancy.  I recommend patient stop Zepbound prior to attempts to get pregnant.  I recommend she also discuss with her OB/GYN.  She reports she is feeling great and has increased energy and exercise tolerance.         Need for Tdap vaccination    Orders:    Tdap Vaccine Greater Than or Equal To 8yo IM                  Follow Up:   No follow-ups on file.      ROSCOE Martini MD  [facility]  Answers submitted by the patient for this visit:  Weight Management (Submitted on 6/9/2025)  Chief Complaint: Weight Management  Weight: decreased  Weight loss treatment: portion control, increasing exercise, prescription medications  Medication side effects: bloating, constipation  Eating habit changes: Eating smaller portions  Energy level: increased  Physical activity tolerance: improved  Treatment barriers: none    "

## 2025-07-07 ENCOUNTER — TELEPHONE (OUTPATIENT)
Dept: FAMILY MEDICINE CLINIC | Facility: CLINIC | Age: 25
End: 2025-07-07
Payer: COMMERCIAL

## 2025-08-11 RX ORDER — OMEPRAZOLE 40 MG/1
40 CAPSULE, DELAYED RELEASE ORAL DAILY
Qty: 90 CAPSULE | Refills: 1 | Status: SHIPPED | OUTPATIENT
Start: 2025-08-11